# Patient Record
Sex: FEMALE | Race: WHITE | NOT HISPANIC OR LATINO
[De-identification: names, ages, dates, MRNs, and addresses within clinical notes are randomized per-mention and may not be internally consistent; named-entity substitution may affect disease eponyms.]

---

## 2022-11-03 ENCOUNTER — RESULT REVIEW (OUTPATIENT)
Age: 77
End: 2022-11-03

## 2022-11-08 ENCOUNTER — TRANSCRIPTION ENCOUNTER (OUTPATIENT)
Age: 77
End: 2022-11-08

## 2022-11-09 PROBLEM — Z00.00 ENCOUNTER FOR PREVENTIVE HEALTH EXAMINATION: Status: ACTIVE | Noted: 2022-11-09

## 2023-04-03 ENCOUNTER — APPOINTMENT (OUTPATIENT)
Dept: UROLOGY | Facility: CLINIC | Age: 78
End: 2023-04-03
Payer: MEDICARE

## 2023-04-03 ENCOUNTER — APPOINTMENT (OUTPATIENT)
Dept: UROLOGY | Facility: CLINIC | Age: 78
End: 2023-04-03

## 2023-04-03 VITALS
RESPIRATION RATE: 14 BRPM | WEIGHT: 115 LBS | HEIGHT: 62 IN | SYSTOLIC BLOOD PRESSURE: 105 MMHG | HEART RATE: 111 BPM | BODY MASS INDEX: 21.16 KG/M2 | DIASTOLIC BLOOD PRESSURE: 62 MMHG

## 2023-04-03 DIAGNOSIS — Z87.19 PERSONAL HISTORY OF OTHER DISEASES OF THE DIGESTIVE SYSTEM: ICD-10-CM

## 2023-04-03 PROCEDURE — 99203 OFFICE O/P NEW LOW 30 MIN: CPT

## 2023-04-03 NOTE — PHYSICAL EXAM
[General Appearance - Well Developed] : well developed [General Appearance - Well Nourished] : well nourished [Normal Appearance] : normal appearance [Well Groomed] : well groomed [General Appearance - In No Acute Distress] : no acute distress [Edema] : no peripheral edema [] : no respiratory distress [Respiration, Rhythm And Depth] : normal respiratory rhythm and effort [Exaggerated Use Of Accessory Muscles For Inspiration] : no accessory muscle use [FreeTextEntry1] : sitting on wheelchair [No Focal Deficits] : no focal deficits [Oriented To Time, Place, And Person] : oriented to person, place, and time [Affect] : the affect was normal [Mood] : the mood was normal [Not Anxious] : not anxious

## 2023-04-03 NOTE — HISTORY OF PRESENT ILLNESS
[FreeTextEntry1] : 76yo F hx diverticulitis s/p colectomy +lysis of adhesion including L ureter 11/2022 s/p nephrostomy tube placement presents to discuss stent possibility\par She also reports UTI symptoms, placed on cefuroxime yesterday.

## 2023-04-03 NOTE — ASSESSMENT
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 \par discussed stent conversion require every 3-4 month exchange under OR\par If underlying stricture exists, will require a procedure to treat stricture first then consider stent placement.

## 2023-06-27 ENCOUNTER — APPOINTMENT (OUTPATIENT)
Dept: COLORECTAL SURGERY | Facility: CLINIC | Age: 78
End: 2023-06-27
Payer: MEDICARE

## 2023-06-27 DIAGNOSIS — Z93.3 COLOSTOMY STATUS: ICD-10-CM

## 2023-06-27 DIAGNOSIS — K94.09 OTHER COMPLICATIONS OF COLOSTOMY: ICD-10-CM

## 2023-06-27 DIAGNOSIS — Z43.3 ENCOUNTER FOR ATTENTION TO COLOSTOMY: ICD-10-CM

## 2023-06-27 DIAGNOSIS — K57.20 DIVERTICULITIS OF LARGE INTESTINE WITH PERFORATION AND ABSCESS W/OUT BLEEDING: ICD-10-CM

## 2023-06-27 PROCEDURE — 99205 OFFICE O/P NEW HI 60 MIN: CPT

## 2023-06-29 PROBLEM — K57.20 COLONIC DIVERTICULAR ABSCESS: Status: ACTIVE | Noted: 2023-06-29

## 2023-06-29 PROBLEM — K94.09 COLOSTOMY HERNIA: Status: ACTIVE | Noted: 2023-06-29

## 2023-06-29 PROBLEM — Z93.3 COLOSTOMY PRESENT: Status: ACTIVE | Noted: 2023-06-29

## 2023-06-29 PROBLEM — Z43.3 COLOSTOMY CARE: Status: ACTIVE | Noted: 2023-06-29

## 2023-06-29 NOTE — REVIEW OF SYSTEMS
[Feeling Tired] : feeling tired [Recent Weight Loss (___ Lbs)] : recent [unfilled] ~Ulb weight loss [As Noted in HPI] : as noted in HPI [Negative] : Heme/Lymph [FreeTextEntry7] : ostomy issues including leaking from pouchj

## 2023-06-29 NOTE — ASSESSMENT
[FreeTextEntry1] : 77 year old female with extensive surgical history including hartmans and left ureter resection for extensive diverticulitis 8 months ago at [facility]

## 2023-06-29 NOTE — HISTORY OF PRESENT ILLNESS
[FreeTextEntry1] : initial office visit for this 77 year old female who had perforated/abscessed and fistulizing sigmoid diverticulitis that underwent Longoria's procedure with left ureter resection 8 months ago at [facility]. she has had an extensive post op recovery period, still in rehab

## 2023-06-29 NOTE — PHYSICAL EXAM
[JVD] : no jugular venous distention  [Normal Breath Sounds] : Normal breath sounds [Normal Heart Sounds] : normal heart sounds [Alert] : alert [Oriented to Person] : oriented to person [Oriented to Place] : oriented to place [Oriented to Time] : oriented to time [Calm] : calm [de-identified] : colostomy and nephrostomy tube [de-identified] : looks frail

## 2023-07-26 ENCOUNTER — APPOINTMENT (OUTPATIENT)
Dept: UROLOGY | Facility: CLINIC | Age: 78
End: 2023-07-26
Payer: MEDICARE

## 2023-07-26 VITALS
RESPIRATION RATE: 13 BRPM | DIASTOLIC BLOOD PRESSURE: 68 MMHG | BODY MASS INDEX: 21.16 KG/M2 | OXYGEN SATURATION: 97 % | SYSTOLIC BLOOD PRESSURE: 104 MMHG | HEIGHT: 62 IN | HEART RATE: 92 BPM | WEIGHT: 115 LBS

## 2023-07-26 DIAGNOSIS — N13.5 CROSSING VESSEL AND STRICTURE OF URETER W/OUT HYDRONEPHROSIS: ICD-10-CM

## 2023-07-26 DIAGNOSIS — N99.81 OTHER INTRAOPERATIVE COMPLICATIONS OF GENITOURINARY SYSTEM: ICD-10-CM

## 2023-07-26 PROCEDURE — 99213 OFFICE O/P EST LOW 20 MIN: CPT

## 2023-07-27 PROBLEM — N99.81 INTRAOPERATIVE URETERAL INJURY: Status: ACTIVE | Noted: 2023-07-27

## 2023-07-27 PROBLEM — N13.5 URETER, STRICTURE: Status: ACTIVE | Noted: 2023-04-03

## 2023-07-27 NOTE — ASSESSMENT
[FreeTextEntry1] : 76 yo female with hx iatrogenic left ureteral injury from светлана procedure  for perforated diverticulitis done at outside hospital\par Currently managed with left NT \par Likely a distal ureteral injury based on limited IR antegrade film from NT exchange. \par discussed extensively the need for repair, more than likely a ureterolysis and reimplant\par She will need a retrograde and antegrade study before to plan this.\par plan for cystoscopy, left retrograde pyelogram, antegrade pyelogram, possible left ureteral stent placement and all indicated procedure to further evaluate the location of ureteral injury prior to definitive surgical repair. \par Patient will contact us when she decides to proceed. \par Will coordinate with colorectal for joint surgery (colostomy reversal) with Dr. Frazier and ureteral repair (reimplant) \par \par She and daughter wan to think about where they want to seek treatment

## 2023-07-27 NOTE — PHYSICAL EXAM
[General Appearance - Well Developed] : well developed [General Appearance - Well Nourished] : well nourished [Normal Appearance] : normal appearance [Well Groomed] : well groomed [General Appearance - In No Acute Distress] : no acute distress [Edema] : no peripheral edema [Respiration, Rhythm And Depth] : normal respiratory rhythm and effort [Exaggerated Use Of Accessory Muscles For Inspiration] : no accessory muscle use [Abdomen Soft] : soft [Abdomen Tenderness] : non-tender [Costovertebral Angle Tenderness] : no ~M costovertebral angle tenderness [Normal Station and Gait] : the gait and station were normal for the patient's age [] : no rash [No Focal Deficits] : no focal deficits [Oriented To Time, Place, And Person] : oriented to person, place, and time [Affect] : the affect was normal [Mood] : the mood was normal [Not Anxious] : not anxious [No Palpable Adenopathy] : no palpable adenopathy [FreeTextEntry1] : left N with clear yellow urine

## 2023-07-27 NOTE — HISTORY OF PRESENT ILLNESS
[FreeTextEntry1] : 76 yo female presents for evaluation of history of iatrogenic left ureteral injury \par hx perforated sigmoid diverticulitis s/p светлана procedure with left ureteraL injury 11/2022 \par Has been managed with left NT since. exchanged by IR regularly \par Had prolonged hospital stay after previous operation. just started to get stronger \par \par  Statement Selected

## 2024-10-15 ENCOUNTER — APPOINTMENT (OUTPATIENT)
Dept: ORTHOPEDIC SURGERY | Facility: CLINIC | Age: 79
End: 2024-10-15
Payer: MEDICARE

## 2024-10-15 VITALS — WEIGHT: 147 LBS | HEIGHT: 62 IN | BODY MASS INDEX: 27.05 KG/M2

## 2024-10-15 DIAGNOSIS — Z78.9 OTHER SPECIFIED HEALTH STATUS: ICD-10-CM

## 2024-10-15 DIAGNOSIS — M19.071 PRIMARY OSTEOARTHRITIS, RIGHT ANKLE AND FOOT: ICD-10-CM

## 2024-10-15 PROCEDURE — 99204 OFFICE O/P NEW MOD 45 MIN: CPT

## 2024-10-15 RX ORDER — PREGABALIN 50 MG/1
50 CAPSULE ORAL
Refills: 0 | Status: ACTIVE | COMMUNITY

## 2024-10-15 RX ORDER — ESCITALOPRAM OXALATE 10 MG/1
10 TABLET, FILM COATED ORAL
Refills: 0 | Status: ACTIVE | COMMUNITY

## 2025-09-01 ENCOUNTER — NON-APPOINTMENT (OUTPATIENT)
Age: 80
End: 2025-09-01